# Patient Record
Sex: FEMALE | Employment: FULL TIME | URBAN - METROPOLITAN AREA
[De-identification: names, ages, dates, MRNs, and addresses within clinical notes are randomized per-mention and may not be internally consistent; named-entity substitution may affect disease eponyms.]

---

## 2018-08-14 ENCOUNTER — OFFICE VISIT (OUTPATIENT)
Dept: URGENT CARE | Facility: URGENT CARE | Age: 43
End: 2018-08-14

## 2018-08-14 VITALS
SYSTOLIC BLOOD PRESSURE: 104 MMHG | TEMPERATURE: 98.4 F | WEIGHT: 140.8 LBS | HEART RATE: 62 BPM | DIASTOLIC BLOOD PRESSURE: 66 MMHG

## 2018-08-14 DIAGNOSIS — L50.9 URTICARIA: ICD-10-CM
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
## 2018-08-14 DIAGNOSIS — S40.861A INSECT BITE OF RIGHT UPPER EXTREMITY, INITIAL ENCOUNTER: Primary | ICD-10-CM

## 2018-08-14 PROCEDURE — 99203 OFFICE O/P NEW LOW 30 MIN: CPT | Performed by: NURSE PRACTITIONER

## 2018-08-14 RX ORDER — CETIRIZINE HYDROCHLORIDE 10 MG/1
10 TABLET ORAL EVERY EVENING
Qty: 14 TABLET | Refills: 0 | Status: SHIPPED | OUTPATIENT
Start: 2018-08-14

## 2018-08-14 RX ORDER — TRIAMCINOLONE ACETONIDE 1 MG/G
OINTMENT TOPICAL
Qty: 30 G | Refills: 0 | Status: SHIPPED | OUTPATIENT
Start: 2018-08-14 | End: 2018-08-14

## 2018-08-14 RX ORDER — TRIAMCINOLONE ACETONIDE 1 MG/G
OINTMENT TOPICAL
Qty: 30 G | Refills: 0 | Status: SHIPPED | OUTPATIENT
Start: 2018-08-14

## 2018-08-14 RX ORDER — CETIRIZINE HYDROCHLORIDE 10 MG/1
10 TABLET ORAL EVERY EVENING
Qty: 14 TABLET | Refills: 0 | Status: SHIPPED | OUTPATIENT
Start: 2018-08-14 | End: 2018-08-14

## 2018-08-14 ASSESSMENT — ENCOUNTER SYMPTOMS
RHINORRHEA: 0
SHORTNESS OF BREATH: 0
DIARRHEA: 0
NAUSEA: 0
VOMITING: 0
FEVER: 0
CHILLS: 0
HEADACHES: 0
SORE THROAT: 0
COUGH: 0

## 2018-08-14 ASSESSMENT — PAIN SCALES - GENERAL: PAINLEVEL: NO PAIN (0)

## 2018-08-14 NOTE — PATIENT INSTRUCTIONS
"  Insect, Spider, and Scorpion Bites and Stings  Most insect bites are harmless and cause only minor swelling or itching. But if you re allergic to insects such as wasps or bees, a sting can cause a life-threatening allergic reaction. Some ticks can carry and transmit serious diseases. The venom (poison) from scorpions and certain spiders can also be deadly, although this is rare. Knowing when to seek emergency care could save your life.     The black  (top) and brown recluse (bottom) are two poisonous spiders found in the [place].   When to go to the emergency room (ER)    Scorpion sting    Bite from a black, red, or brown  spider or brown recluse spider    Severe pain or swelling at the site of bite    A tick that is embedded in your skin and can not be easily removed at home    Signs of an allergic reaction such as:  ? Hives  ? Swelling of your eyes, lips, or the inside of your throat  ? Trouble breathing  ? Dizziness or confusion  What to expect in the ER    If you re having trouble breathing, you ll be given oxygen through a mask. In case of severe breathing difficulty, you may have a tube inserted in your throat and be placed on a ventilator (breathing machine).    If you are having a severe allergic reaction from a sting (called anaphylaxis), you may be given a shot of epinephrine. If it is known that you are allergic to bee or wasp stings, your doctor may give you a prescription for an \"epi-pen\" that you can keep with you at all times in case of a sting.    You may receive antivenin (a substance that reverses the effects of poison) for some spider bites and scorpion stings. Because antivenin can sometimes cause other problems, your doctor will weigh the risks and benefits of this treatment.    Steroids such as prednisone are often used to treat allergic reactions. In many cases, your doctor will also prescribe an antihistamine to help relieve itching.  Easing symptoms of an insect bite or " sting    Try to remove a stinger you can see. Use your fingernail, a knife edge, or credit card to scrape against the skin. Do not squeeze or pull.    Apply ice or a cold compress to reduce pain and swelling (keep a thin cloth between the cold source and the skin).   Date Last Reviewed: 12/1/2016 2000-2017 [employer]. [address] [place] [address], [place] [address]. All rights reserved. This information is not intended as a substitute for professional medical care. Always follow your healthcare professional's instructions.

## 2018-08-14 NOTE — PROGRESS NOTES
SUBJECTIVE:   Jaymie Harvey is a 42 year old female presenting with a chief complaint of   Chief Complaint   Patient presents with     Work Comp     Insect Bites     spider bite 8/14/2018: right arm. pt has itching, redness, and warm to touch. Pt has applied ointment but unsure of the name       She is a new patient of [facility].    Rash    Onset of rash was 2 hour(s) ago.   Course of illness is worsening.  Severity moderate  Current and Associated symptoms: itching, burning, swelling and red   Location of the rash: arm, lower.  Previous history of a similar rash? No  Recent exposure history: insect bite  Denies exposure to: none known  Associated symptoms include: nothing.  Treatment measures tried include: none      Review of Systems   Constitutional: Negative for chills and fever.   HENT: Negative for congestion, ear pain, rhinorrhea and sore throat.    Respiratory: Negative for cough and shortness of breath.    Gastrointestinal: Negative for diarrhea, nausea and vomiting.   Skin: Positive for rash.   Neurological: Negative for headaches.   All other systems reviewed and are negative.      No past medical history on file.  No family history on file.  Current Outpatient Prescriptions   Medication Sig Dispense Refill     cetirizine (ZYRTEC) 10 MG tablet Take 1 tablet (10 mg) by mouth every evening for 14 days 14 tablet 0     triamcinolone (KENALOG) 0.1 % ointment Apply sparingly to affected area three times daily for 14 days. 30 g 0     Social History   Substance Use Topics     Smoking status: Not on file     Smokeless tobacco: Not on file     Alcohol use Not on file       OBJECTIVE  /66 (BP Location: Left arm, Patient Position: Sitting, Cuff Size: Adult Regular)  Pulse 62  Temp 98.4  F (36.9  C) (Oral)  Wt 140 lb 12.8 oz (63.9 kg)    Physical Exam   Cardiovascular: Normal rate.    Pulmonary/Chest: Effort normal.   Neurological: She is alert.   Skin: Rash noted.        Psychiatric: She has a normal mood and  affect. Her behavior is normal. Thought content normal.   Vitals reviewed.      ASSESSMENT:      ICD-10-CM    1. Insect bite of right upper extremity, initial encounter S40.861A triamcinolone (KENALOG) 0.1 % ointment    W57.XXXA    2. Urticaria L50.9 cetirizine (ZYRTEC) 10 MG tablet          Serious Comorbid Conditions:  Adult:  None    PLAN:  I have discussed clinical findings with patient.  Advised no to scratch on the rash.  Symptomatic care is discussed.  I have discussed the possibility of  worsening symptoms and to RTC or ER if they occur.  All questions are answered and patient is in agreement with plan.   Patient care instructions are given to at the end of visit.

## 2018-08-14 NOTE — LETTER
[facility]  [address]  [facility] [address]  Phone: [phone]    August 14, 2018        Jaymie Harvey  [address]   [facility] [address]          To whom it may concern:    RE: Jaymie Harvey    Patient was seen and treated today at our clinic and missed work. Please allow her to rest home 8/15/2018. Patient may return to work 8/16/2018 with no restrictions.    Please contact me for questions or concerns.      Sincerely,        Meghan Hernandez NP

## 2018-08-14 NOTE — MR AVS SNAPSHOT
"              After Visit Summary   8/14/2018    Jaymie Harvey    MRN: [id number]           Patient Information     Date Of Birth          1975        Visit Information        Provider Department      8/14/2018 11:00 AM Meghan Hernandez NP [facility]        Today's Diagnoses     Insect bite of right upper extremity, initial encounter    -  1    Urticaria          Care Instructions      Insect, Spider, and Scorpion Bites and Stings  Most insect bites are harmless and cause only minor swelling or itching. But if you re allergic to insects such as wasps or bees, a sting can cause a life-threatening allergic reaction. Some ticks can carry and transmit serious diseases. The venom (poison) from scorpions and certain spiders can also be deadly, although this is rare. Knowing when to seek emergency care could save your life.     The black  (top) and brown recluse (bottom) are two poisonous spiders found in the [place].   When to go to the emergency room (ER)    Scorpion sting    Bite from a black, red, or brown  spider or brown recluse spider    Severe pain or swelling at the site of bite    A tick that is embedded in your skin and can not be easily removed at home    Signs of an allergic reaction such as:  ? Hives  ? Swelling of your eyes, lips, or the inside of your throat  ? Trouble breathing  ? Dizziness or confusion  What to expect in the ER    If you re having trouble breathing, you ll be given oxygen through a mask. In case of severe breathing difficulty, you may have a tube inserted in your throat and be placed on a ventilator (breathing machine).    If you are having a severe allergic reaction from a sting (called anaphylaxis), you may be given a shot of epinephrine. If it is known that you are allergic to bee or wasp stings, your doctor may give you a prescription for an \"epi-pen\" that you can keep with you at all times in case of a sting.    You may receive antivenin (a " substance that reverses the effects of poison) for some spider bites and scorpion stings. Because antivenin can sometimes cause other problems, your doctor will weigh the risks and benefits of this treatment.    Steroids such as prednisone are often used to treat allergic reactions. In many cases, your doctor will also prescribe an antihistamine to help relieve itching.  Easing symptoms of an insect bite or sting    Try to remove a stinger you can see. Use your fingernail, a knife edge, or credit card to scrape against the skin. Do not squeeze or pull.    Apply ice or a cold compress to reduce pain and swelling (keep a thin cloth between the cold source and the skin).   Date Last Reviewed: 12/1/2016 2000-2017 [employer]. [address] [place] [address], [place] [address]. All rights reserved. This information is not intended as a substitute for professional medical care. Always follow your healthcare professional's instructions.                Follow-ups after your visit        Who to contact     If you have questions or need follow up information about today's clinic visit or your schedule please contact [facility] directly at [phone].  Normal or non-critical lab and imaging results will be communicated to you by Photos to Photoshart, letter or phone within 4 business days after the clinic has received the results. If you do not hear from us within 7 days, please contact the clinic through Photos to Photoshart or phone. If you have a critical or abnormal lab result, we will notify you by phone as soon as possible.  Submit refill requests through Healthcare IT or call your pharmacy and they will forward the refill request to us. Please allow 3 business days for your refill to be completed.          Additional Information About Your Visit        Photos to PhotosharShareholder InSite Information     Healthcare IT lets you send messages to your doctor, view your test results, renew your prescriptions, schedule appointments and more. To sign up, go to  [email or web address] . Click on \"Log in\" on the left side of the screen, which will take you to the Welcome page. Then click on \"Sign up Now\" on the right side of the page.     You will be asked to enter the access code listed below, as well as some personal information. Please follow the directions to create your username and password.     Your access code is: [email or web address]  Expires: 2018 11:23 AM     Your access code will  in 90 days. If you need help or a new code, please call your [place] clinic or [phone].        Care EveryWhere ID     This is your Care EveryWhere ID. This could be used by other organizations to access your [place] medical records  [id number]        Your Vitals Were     Pulse Temperature                62 98.4  F (36.9  C) (Oral)           Blood Pressure from Last 3 Encounters:   18 104/66    Weight from Last 3 Encounters:   18 140 lb 12.8 oz (63.9 kg)              Today, you had the following     No orders found for display         Today's Medication Changes          These changes are accurate as of 18 11:23 AM.  If you have any questions, ask your nurse or doctor.               Start taking these medicines.        Dose/Directions    cetirizine 10 MG tablet   Commonly known as:  zyrTEC   Used for:  Urticaria        Dose:  10 mg   Take 1 tablet (10 mg) by mouth every evening for 14 days   Quantity:  14 tablet   Refills:  0       triamcinolone 0.1 % ointment   Commonly known as:  KENALOG   Used for:  Insect bite of right upper extremity, initial encounter        Apply sparingly to affected area three times daily for 14 days.   Quantity:  30 g   Refills:  0            Where to get your medicines      Some of these will need a paper prescription and others can be bought over the counter.  Ask your nurse if you have questions.     Bring a paper prescription for each of these medications     cetirizine 10 MG tablet    triamcinolone 0.1 % ointment             "    Primary Care Provider Fax #    Physician No Ref-Primary [phone]       No address on file        Equal Access to Services     ERICK GOODEN : Hadii aad ku hadtesfaye Dodson, wacoyda luhero, james kacristóbalda kaylyn, alon josselinetawanda frank lamairaeder alcantar. So [facility] [phone].    ATENCIÓN: Si habla español, tiene a whipple disposición servicios gratuitos de asistencia lingüística. Llame al [phone].    We comply with applicable federal civil rights laws and [place] laws. We do not discriminate on the basis of race, color, national origin, age, disability, sex, sexual orientation, or gender identity.            Thank you!     Thank you for choosing [facility]  for your care. Our goal is always to provide you with excellent care. Hearing back from our patients is one way we can continue to improve our services. Please take a few minutes to complete the written survey that you may receive in the mail after your visit with us. Thank you!             Your Updated Medication List - Protect others around you: Learn how to safely use, store and throw away your medicines at www.disposemymeds.org.          This list is accurate as of 8/14/18 11:23 AM.  Always use your most recent med list.                   Brand Name Dispense Instructions for use Diagnosis    cetirizine 10 MG tablet    zyrTEC    14 tablet    Take 1 tablet (10 mg) by mouth every evening for 14 days    Urticaria       triamcinolone 0.1 % ointment    KENALOG    30 g    Apply sparingly to affected area three times daily for 14 days.    Insect bite of right upper extremity, initial encounter

## 2018-09-12 ENCOUNTER — HOSPITAL ENCOUNTER (OUTPATIENT)
Dept: GENERAL RADIOLOGY | Facility: CLINIC | Age: 43
Discharge: HOME | End: 2018-09-12
Attending: INTERNAL MEDICINE | Admitting: INTERNAL MEDICINE

## 2018-09-12 DIAGNOSIS — R76.11 PPD POSITIVE: ICD-10-CM

## 2018-09-12 PROCEDURE — 40000293 XR CHEST 1 VIEW, EMPLOYEE HEALTH

## 2018-12-01 ENCOUNTER — HOSPITAL ENCOUNTER (EMERGENCY)
Facility: CLINIC | Age: 43
Discharge: HOME | End: 2018-12-01
Attending: EMERGENCY MEDICINE | Admitting: EMERGENCY MEDICINE
Payer: OTHER MISCELLANEOUS

## 2018-12-01 VITALS
HEART RATE: 65 BPM | OXYGEN SATURATION: 99 % | SYSTOLIC BLOOD PRESSURE: 101 MMHG | RESPIRATION RATE: 18 BRPM | DIASTOLIC BLOOD PRESSURE: 46 MMHG | TEMPERATURE: 99 F

## 2018-12-01 DIAGNOSIS — Z77.098 CHEMICAL EXPOSURE: ICD-10-CM

## 2018-12-01 PROCEDURE — 99283 EMERGENCY DEPT VISIT LOW MDM: CPT | Mod: Z6 | Performed by: EMERGENCY MEDICINE

## 2018-12-01 PROCEDURE — 99282 EMERGENCY DEPT VISIT SF MDM: CPT | Performed by: EMERGENCY MEDICINE

## 2018-12-01 ASSESSMENT — ENCOUNTER SYMPTOMS
NAUSEA: 0
WOUND: 1
DIARRHEA: 0
SORE THROAT: 0
WEAKNESS: 0
BACK PAIN: 0
COUGH: 0
CONFUSION: 0
RHINORRHEA: 0
SHORTNESS OF BREATH: 0
EYE DISCHARGE: 0
HEADACHES: 0
ABDOMINAL PAIN: 0
FEVER: 0
BRUISES/BLEEDS EASILY: 0
CHILLS: 0
MYALGIAS: 0
FLANK PAIN: 0
VOMITING: 0
DYSURIA: 0
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
## 2018-12-01 NOTE — ED AVS SNAPSHOT
[facility], [place], Emergency Department    [address] [place] [address]    Phone:  [phone]                                       Jaymie Harvey   MRN: [id number]    Department:  [facility], Emergency Department   Date of Visit:  12/1/2018           After Visit Summary Signature Page     I have received my discharge instructions, and my questions have been answered. I have discussed any challenges I see with this plan with the nurse or doctor.    ..........................................................................................................................................  Patient/Patient Representative Signature      ..........................................................................................................................................  Patient Representative Print Name and Relationship to Patient    ..................................................               ................................................  Date                                   Time    ..........................................................................................................................................  Reviewed by Signature/Title    ...................................................              ..............................................  Date                                               Time          22EPIC Rev 08/18
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
## 2018-12-01 NOTE — ED NOTES
"Patient told that treatment for exposure is to \"wash off immediately with plenty of water.\" (per MSDS).  "

## 2018-12-01 NOTE — LETTER
December 1, 2018      To Whom It May Concern:      Jaymie Harvey was seen in our Emergency Department today, 12/01/18.   She may return to work when improved.    Sincerely,        Leanne Lauren MD
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
## 2019-12-05 ENCOUNTER — OFFICE VISIT (OUTPATIENT)
Dept: OPTOMETRY | Facility: CLINIC | Age: 44
End: 2019-12-05
Payer: COMMERCIAL

## 2019-12-05 DIAGNOSIS — H52.4 PRESBYOPIA: ICD-10-CM

## 2019-12-05 DIAGNOSIS — H52.223 REGULAR ASTIGMATISM OF BOTH EYES: ICD-10-CM

## 2019-12-05 DIAGNOSIS — Z01.00 ENCOUNTER FOR EXAMINATION OF EYES AND VISION WITHOUT ABNORMAL FINDINGS: Primary | ICD-10-CM

## 2019-12-05 PROCEDURE — 92004 COMPRE OPH EXAM NEW PT 1/>: CPT | Performed by: OPTOMETRIST

## 2019-12-05 PROCEDURE — 92015 DETERMINE REFRACTIVE STATE: CPT | Performed by: OPTOMETRIST

## 2019-12-05 ASSESSMENT — VISUAL ACUITY
CORRECTION_TYPE: GLASSES
OD_SC: 20/60
OD_SC+: -1
OS_SC: 20/120
METHOD: SNELLEN - LINEAR
OS_CC: 20/20 -1
OD_SC: 20/30
OS_SC: 20/25
OD_CC: 20/20 -1
OS_CC: 20/30
OD_CC: 20/30

## 2019-12-05 ASSESSMENT — REFRACTION_MANIFEST
OD_SPHERE: -0.25
OS_ADD: +2.00
OS_SPHERE: +0.50
OS_AXIS: 003
OD_AXIS: 030
OD_CYLINDER: +0.50
OD_ADD: +2.00
OS_CYLINDER: +0.75

## 2019-12-05 ASSESSMENT — TONOMETRY
OD_IOP_MMHG: 11
IOP_METHOD: APPLANATION
OS_IOP_MMHG: 10

## 2019-12-05 ASSESSMENT — CONF VISUAL FIELD
OS_NORMAL: 1
OD_NORMAL: 1

## 2019-12-05 ASSESSMENT — REFRACTION_WEARINGRX
OD_ADD: +2.00
OD_CYLINDER: +0.50
OS_AXIS: 172
OS_SPHERE: +0.75
OD_AXIS: 180
OS_ADD: +2.00
SPECS_TYPE: BIFOCAL
OS_CYLINDER: +0.75
OD_SPHERE: +0.50

## 2019-12-05 ASSESSMENT — SLIT LAMP EXAM - LIDS
COMMENTS: NORMAL
COMMENTS: NORMAL

## 2019-12-05 ASSESSMENT — CUP TO DISC RATIO
OD_RATIO: 0.3
OS_RATIO: 0.35

## 2019-12-05 ASSESSMENT — EXTERNAL EXAM - RIGHT EYE: OD_EXAM: NORMAL

## 2019-12-05 ASSESSMENT — EXTERNAL EXAM - LEFT EYE: OS_EXAM: NORMAL

## 2019-12-05 NOTE — PATIENT INSTRUCTIONS
Jaymie was advised of today's exam findings.  Fill glasses prescription  Allow 2 weeks to adapt to change in glasses  Wear glasses full time  Copy of glasses Rx provided today.    Return in 2 years for eye exam,  unless health reason arises , or sooner if needed.    The effects of the dilating drops last for 4- 6 hours.  You will be more sensitive to light and vision will be blurry up close.  Mydriatic sunglasses were given if needed.      Kevin Esquivel O.D.  [address]  [place]  [address]    [phone]

## 2019-12-05 NOTE — PROGRESS NOTES
Chief Complaint   Patient presents with     Annual Eye Exam      Accompanied by self  Last Eye Exam: 2 years ago  Dilated Previously: Yes    What are you currently using to see?  Glasses-2 years old       Distance Vision Acuity: Noticed gradual change in both eyes    Near Vision Acuity: Not satisfied     Eye Comfort: good  Do you use eye drops? : No  Occupation or Hobbies: surgical tech    Elicia Cadet, Optometric Tech          Medical, surgical and family histories reviewed and updated 12/5/2019.       OBJECTIVE: See Ophthalmology exam    ASSESSMENT:    ICD-10-CM    1. Encounter for examination of eyes and vision without abnormal findings Z01.00 EYE EXAM (SIMPLE-NONBILLABLE)   2. Regular astigmatism of both eyes H52.223 EYE EXAM (SIMPLE-NONBILLABLE)     REFRACTION   3. Presbyopia H52.4 EYE EXAM (SIMPLE-NONBILLABLE)     REFRACTION      PLAN:     Patient Instructions   Gmasnoh was advised of today's exam findings.  Fill glasses prescription  Allow 2 weeks to adapt to change in glasses  Wear glasses full time  Copy of glasses Rx provided today.    Return in 2 years for eye exam,  unless health reason arises , or sooner if needed.    The effects of the dilating drops last for 4- 6 hours.  You will be more sensitive to light and vision will be blurry up close.  Mydriatic sunglasses were given if needed.      Kevin Esquivel O.D.  [facility]  [address] [place] [address]. [place]  [address]    [phone]

## 2019-12-05 NOTE — LETTER
12/5/2019         RE: Jaymie Harvey  [address]  [place] [address]        Dear Colleague,    Thank you for referring your patient, Jaymie Harvey, to the [facility]. Please see a copy of my visit note below.    Chief Complaint   Patient presents with     Annual Eye Exam      Accompanied by self  Last Eye Exam: 2 years ago  Dilated Previously: Yes    What are you currently using to see?  Glasses-2 years old       Distance Vision Acuity: Noticed gradual change in both eyes    Near Vision Acuity: Not satisfied     Eye Comfort: good  Do you use eye drops? : No  Occupation or Hobbies: surgical tech    Elicia Cadet, Optometric Tech          Medical, surgical and family histories reviewed and updated 12/5/2019.       OBJECTIVE: See Ophthalmology exam    ASSESSMENT:    ICD-10-CM    1. Encounter for examination of eyes and vision without abnormal findings Z01.00 EYE EXAM (SIMPLE-NONBILLABLE)   2. Regular astigmatism of both eyes H52.223 EYE EXAM (SIMPLE-NONBILLABLE)     REFRACTION   3. Presbyopia H52.4 EYE EXAM (SIMPLE-NONBILLABLE)     REFRACTION      PLAN:     Patient Instructions   Jaymie was advised of today's exam findings.  Fill glasses prescription  Allow 2 weeks to adapt to change in glasses  Wear glasses full time  Copy of glasses Rx provided today.    Return in 2 years for eye exam,  unless health reason arises , or sooner if needed.    The effects of the dilating drops last for 4- 6 hours.  You will be more sensitive to light and vision will be blurry up close.  Mydriatic sunglasses were given if needed.      Kevin Esquivel O.D.  [facility]  [address] [place] [address] Ave. [place]  [address]    [phone]           Again, thank you for allowing me to participate in the care of your patient.        Sincerely,        Kevin Esquivel, OD

## 2020-05-04 ENCOUNTER — RESULTS ONLY (OUTPATIENT)
Dept: LAB | Age: 45
End: 2020-05-04

## 2020-05-04 ENCOUNTER — APPOINTMENT (OUTPATIENT)
Dept: URGENT CARE | Facility: URGENT CARE | Age: 45
End: 2020-05-04
Payer: COMMERCIAL

## 2020-05-05 LAB
SARS-COV-2 RNA SPEC QL NAA+PROBE: ABNORMAL
SPECIMEN SOURCE: ABNORMAL

## 2020-05-12 ENCOUNTER — APPOINTMENT (OUTPATIENT)
Dept: URGENT CARE | Facility: URGENT CARE | Age: 45
End: 2020-05-12
Payer: COMMERCIAL

## 2020-05-12 ENCOUNTER — RESULTS ONLY (OUTPATIENT)
Dept: LAB | Age: 45
End: 2020-05-12

## 2020-05-14 LAB
SARS-COV-2 RNA SPEC QL NAA+PROBE: NOT DETECTED
SPECIMEN SOURCE: NORMAL

## 2020-05-20 ENCOUNTER — RESULTS ONLY (OUTPATIENT)
Dept: LAB | Age: 45
End: 2020-05-20

## 2020-05-20 ENCOUNTER — APPOINTMENT (OUTPATIENT)
Dept: URGENT CARE | Facility: URGENT CARE | Age: 45
End: 2020-05-20
Payer: COMMERCIAL

## 2020-05-21 LAB
SARS-COV-2 RNA SPEC QL NAA+PROBE: ABNORMAL
SPECIMEN SOURCE: ABNORMAL

## 2021-08-27 ENCOUNTER — OFFICE VISIT (OUTPATIENT)
Dept: OPTOMETRY | Facility: CLINIC | Age: 46
End: 2021-08-27
Payer: COMMERCIAL

## 2021-08-27 DIAGNOSIS — H52.223 REGULAR ASTIGMATISM OF BOTH EYES: ICD-10-CM

## 2021-08-27 DIAGNOSIS — H52.4 PRESBYOPIA: ICD-10-CM

## 2021-08-27 DIAGNOSIS — H52.03 HYPERMETROPIA, BILATERAL: ICD-10-CM

## 2021-08-27 DIAGNOSIS — Z01.00 ENCOUNTER FOR EXAMINATION OF EYES AND VISION WITHOUT ABNORMAL FINDINGS: Primary | ICD-10-CM

## 2021-08-27 PROBLEM — N97.9 INFERTILITY, FEMALE: Status: ACTIVE | Noted: 2019-08-23

## 2021-08-27 PROBLEM — N89.8 VAGINAL DISCHARGE: Status: ACTIVE | Noted: 2017-05-24

## 2021-08-27 PROBLEM — H10.13 ALLERGIC CONJUNCTIVITIS OF BOTH EYES: Status: ACTIVE | Noted: 2017-04-14

## 2021-08-27 PROBLEM — R10.33 PERIUMBILICAL ABDOMINAL PAIN: Status: ACTIVE | Noted: 2017-05-03

## 2021-08-27 PROBLEM — M79.10 MYALGIA: Status: ACTIVE | Noted: 2019-10-29

## 2021-08-27 PROBLEM — H52.203 HYPEROPIA OF BOTH EYES WITH ASTIGMATISM AND PRESBYOPIA: Status: ACTIVE | Noted: 2017-06-02

## 2021-08-27 PROBLEM — R30.0 DYSURIA: Status: ACTIVE | Noted: 2019-02-12

## 2021-08-27 PROCEDURE — 92014 COMPRE OPH EXAM EST PT 1/>: CPT | Performed by: OPTOMETRIST

## 2021-08-27 PROCEDURE — 92015 DETERMINE REFRACTIVE STATE: CPT | Performed by: OPTOMETRIST

## 2021-08-27 RX ORDER — PRENATAL VIT/IRON FUM/FOLIC AC 27MG-0.8MG
TABLET ORAL
COMMUNITY
Start: 2021-08-25

## 2021-08-27 RX ORDER — ATAZANAVIR AND COBICISTAT 300; 150 MG/1; MG/1
TABLET ORAL
COMMUNITY
Start: 2021-04-22

## 2021-08-27 RX ORDER — DOLUTEGRAVIR SODIUM 50 MG/1
TABLET, FILM COATED ORAL
COMMUNITY
Start: 2021-08-25

## 2021-08-27 RX ORDER — EMTRICITABINE AND TENOFOVIR DISOPROXIL FUMARATE 200; 300 MG/1; MG/1
TABLET, FILM COATED ORAL
COMMUNITY
Start: 2021-08-25

## 2021-08-27 RX ORDER — CHOLECALCIFEROL (VITAMIN D3) 25 MCG
TABLET ORAL
COMMUNITY
Start: 2021-02-25

## 2021-08-27 RX ORDER — ACETAMINOPHEN 325 MG/1
650 TABLET ORAL
COMMUNITY
Start: 2019-01-21

## 2021-08-27 ASSESSMENT — REFRACTION_WEARINGRX
OD_AXIS: 031
OS_CYLINDER: +0.75
OD_SPHERE: PLANO
SPECS_TYPE: PAL
OD_CYLINDER: +0.75
OD_ADD: +1.75
OS_SPHERE: +0.50
OS_AXIS: 179
OS_ADD: +1.75

## 2021-08-27 ASSESSMENT — EXTERNAL EXAM - LEFT EYE: OS_EXAM: NORMAL

## 2021-08-27 ASSESSMENT — VISUAL ACUITY
OD_CC: 20/50
OS_CC: 20/60
OS_CC: 20/50
OD_SC: 20/60
CORRECTION_TYPE: GLASSES
METHOD: SNELLEN - LINEAR
OD_SC: 20/40
OS_SC: 20/120
OS_SC: 20/40
OD_CC: 20/60

## 2021-08-27 ASSESSMENT — REFRACTION_MANIFEST
OD_ADD: +2.00
OS_SPHERE: +0.50
OS_CYLINDER: +0.50
OS_ADD: +2.00
OD_SPHERE: +0.50
OS_AXIS: 003
OD_AXIS: 015
OD_CYLINDER: +0.50

## 2021-08-27 ASSESSMENT — TONOMETRY
OS_IOP_MMHG: 10
IOP_METHOD: APPLANATION
OD_IOP_MMHG: 10

## 2021-08-27 ASSESSMENT — CONF VISUAL FIELD
METHOD: COUNTING FINGERS
OD_NORMAL: 1
OS_NORMAL: 1

## 2021-08-27 ASSESSMENT — CUP TO DISC RATIO
OS_RATIO: 0.35
OD_RATIO: 0.3

## 2021-08-27 ASSESSMENT — SLIT LAMP EXAM - LIDS
COMMENTS: NORMAL, ARTIFICIAL LASHES UL
COMMENTS: NORMAL, ARTIFICIAL LASHES UL

## 2021-08-27 ASSESSMENT — EXTERNAL EXAM - RIGHT EYE: OD_EXAM: NORMAL

## 2021-08-27 NOTE — LETTER
8/27/2021         RE: Jaymie Harvey  [address] [place]  [place] [facility] [place] [address]        Dear Colleague,    Thank you for referring your patient, Jaymie Harvey, to the [facility]. Please see a copy of my visit note below.    Chief Complaint   Patient presents with     Annual Eye Exam         Last Eye Exam: 12/5/2019  Dilated Previously: Yes, side effects of dilation explained today    What are you currently using to see?  glasses    Distance Vision Acuity: Noticed gradual change in both eyes    Near Vision Acuity: Not satisfied    Eye Comfort: itchy (rarely)  Do you use eye drops? : No  Occupation or Hobbies: [employer]        Medical, surgical and family histories reviewed and updated 8/27/2021.       OBJECTIVE: See Ophthalmology exam    ASSESSMENT:    ICD-10-CM    1. Encounter for examination of eyes and vision without abnormal findings  Z01.00    2. Hypermetropia, bilateral  H52.03    3. Regular astigmatism of both eyes  H52.223    4. Presbyopia  H52.4       PLAN:     Patient Instructions   Jaymie was advised of today's exam findings.  Optional to fill new glasses prescription, minimal change  Copy of glasses Rx provided today.    Return in 2 years for eye exam,  or sooner if needed.    The effects of the dilating drops last for 4- 6 hours.  You will be more sensitive to light and vision will be blurry up close.  Mydriatic sunglasses were given if needed.    Kevin Esquivel O.D.  [facility]  [address] [place] [address] [place]. [place]  [address]    [phone]             Again, thank you for allowing me to participate in the care of your patient.        Sincerely,        Kevin Esquivel OD

## 2021-08-27 NOTE — PATIENT INSTRUCTIONS
Jamyie was advised of today's exam findings.  Optional to fill new glasses prescription, minimal change  Copy of glasses Rx provided today.    Return in 2 years for eye exam,  or sooner if needed.    The effects of the dilating drops last for 4- 6 hours.  You will be more sensitive to light and vision will be blurry up close.  Mydriatic sunglasses were given if needed.    Kevin Esquivel O.D.  [address]  [place]  [address]    [phone]

## 2021-08-27 NOTE — PROGRESS NOTES
Chief Complaint   Patient presents with     Annual Eye Exam         Last Eye Exam: 12/5/2019  Dilated Previously: Yes, side effects of dilation explained today    What are you currently using to see?  glasses    Distance Vision Acuity: Noticed gradual change in both eyes    Near Vision Acuity: Not satisfied    Eye Comfort: itchy (rarely)  Do you use eye drops? : No  Occupation or Hobbies: [employer]        Medical, surgical and family histories reviewed and updated 8/27/2021.       OBJECTIVE: See Ophthalmology exam    ASSESSMENT:    ICD-10-CM    1. Encounter for examination of eyes and vision without abnormal findings  Z01.00    2. Hypermetropia, bilateral  H52.03    3. Regular astigmatism of both eyes  H52.223    4. Presbyopia  H52.4       PLAN:     Patient Instructions   Gmasnoh was advised of today's exam findings.  Optional to fill new glasses prescription, minimal change  Copy of glasses Rx provided today.    Return in 2 years for eye exam,  or sooner if needed.    The effects of the dilating drops last for 4- 6 hours.  You will be more sensitive to light and vision will be blurry up close.  Mydriatic sunglasses were given if needed.    Kevin Esquivel O.D.  [facility]  [address] [place] [address]. [place]  [address] [phone]

## 2023-09-27 ENCOUNTER — NURSE TRIAGE (OUTPATIENT)
Dept: OPTOMETRY | Facility: CLINIC | Age: 48
End: 2023-09-27
Payer: COMMERCIAL

## 2023-09-27 NOTE — TELEPHONE ENCOUNTER
"47 year old calls with blurred vision  She states it started about 3 weeks ago  She started to experience headaches and eye ache also  She states if she puts on her reading glasses the headache/eye ache improves The blurriness continues with the reading glasses  both eyes are equally blurry If she lays down and closes her eyes the pain improves also   Denies any neurological deficit     Will forward to eye team to follow-up             Reason for Disposition   Patient wants to be seen    Additional Information   Negative: Weakness of the face, arm or leg on one side of the body   Negative: Followed getting substance in the eye   Negative: Foreign body stuck in the eye   Negative: Followed an eye injury   Negative: Followed sun lamp or sun exposure (UV keratitis)   Negative: Yellow or green discharge (pus) in the eye   Negative: Pregnant   Negative: Complete loss of vision in one or both eyes   Negative: SEVERE eye pain   Negative: SEVERE headache   Negative: Double vision   Negative: Blurred vision or visual changes and present now and sudden onset or new (e.g., minutes, hours, days)  (Exception: Seeing floaters / black specks OR previously diagnosed migraine headaches with same symptoms.)   Negative: Patient sounds very sick or weak to the triager   Negative: Flashes of light  (Exception: Brief from pressing on the eyeball.)   Negative: Many floaters in the eye (Exception: Floater(s) are a chronic symptom and this is unchanged from patient's baseline pattern.)   Negative: Eye pain and brief (now gone) blurred vision or visual changes   Negative: Taking digoxin (e.g., Lanoxin, Digitek, Cardoxin, Lanoxicaps; Toloxin in [place]) and blurred vision, yellow vision, or yellow-green halos    Answer Assessment - Initial Assessment Questions  1. DESCRIPTION: \"How has your vision changed?\" (e.g., complete vision loss, blurred vision, double vision, floaters, etc.)      Blurred vision  2. LOCATION: \"One or both eyes?\" If one, " "ask: \"Which eye?\"      Both eyes  3. SEVERITY: \"Can you see anything?\" If Yes, ask: \"What can you see?\" (e.g., fine print)      Yes but blurry  4. ONSET: \"When did this begin?\" \"Did it start suddenly or has this been gradual?\"      3 weeks ago  5. PATTERN: \"Does this come and go, or has it been constant since it started?\"      Blurred vision constant  6. PAIN: \"Is there any pain in your eye(s)?\"  (Scale 1-10; or mild, moderate, severe)    - NONE (0): No pain.    - MILD (1-3): Doesn't interfere with normal activities.    - MODERATE (4-7): Interferes with normal activities or awakens from sleep.     - SEVERE (8-10): Excruciating pain, unable to do any normal activities.      Pain eyes and forehead   mild to moderate  7. CONTACTS-GLASSES: \"Do you wear contacts or glasses?\"      Reading glasses  8. CAUSE: \"What do you think is causing this visual problem?\"      Not sure  9. OTHER SYMPTOMS: \"Do you have any other symptoms?\" (e.g., confusion, headache, arm or leg weakness, speech problems)      Headache  but improves when wearing reading glasses  10. PREGNANCY: \"Is there any chance you are pregnant?\" \"When was your last menstrual period?\"        no    Protocols used: Vision Loss or Change-A-OH    "

## 2023-09-27 NOTE — TELEPHONE ENCOUNTER
Caller reporting the following red-flag symptom(s): vision loss, approx 3 wks and now is getting worse, blurry. Irritated, discomfort, bad head aches, both eyes     Per the system red-flag symptom policy, patient was instructed to:  speak with a Registered Nurse    Action:  Patient warm transferred to a Registered Nurse Katerine

## 2023-09-27 NOTE — TELEPHONE ENCOUNTER
Called twice at 1729    No answer and unable to leave [facility]--mailbox full    Home/mobile--[phone] called

## 2023-09-28 NOTE — TELEPHONE ENCOUNTER
Spoke to pt around noon    Pt states wears progressive bifocals for 3 years and doing just fine    Past 3 weeks blurrier vision both far and near and having more headaches    Blurrier vision with glasses constant and seems to be worseing.    No redness/other eye symptoms noted    Scheduled in open new time slot with Dr. Feliciano at 0800    Pt aware of date/time/location/duration/hospital based clinic/main clinic number/parking-- PWB location    Yosef Coronel RN 12:27 PM 09/28/23

## 2023-09-28 NOTE — TELEPHONE ENCOUNTER
Patient calling back.   Patient was transferred from the [facility] to this [facility] RN. Incorrect transfer.     Patient would like to be called back soon. She has her phone near her and waiting for eye dept to call her back.     Sol VELASQUEZN, RN

## 2023-09-29 ENCOUNTER — OFFICE VISIT (OUTPATIENT)
Dept: OPHTHALMOLOGY | Facility: CLINIC | Age: 48
End: 2023-09-29
Attending: OPTOMETRIST
Payer: COMMERCIAL

## 2023-09-29 DIAGNOSIS — G44.89 OTHER HEADACHE SYNDROME: ICD-10-CM

## 2023-09-29 DIAGNOSIS — H52.4 PRESBYOPIA OF BOTH EYES: Primary | ICD-10-CM

## 2023-09-29 DIAGNOSIS — H25.13 NUCLEAR SENILE CATARACT OF BOTH EYES: ICD-10-CM

## 2023-09-29 DIAGNOSIS — Z01.00 EXAMINATION OF EYES AND VISION: ICD-10-CM

## 2023-09-29 DIAGNOSIS — H04.123 DRY EYE SYNDROME OF BOTH EYES: ICD-10-CM

## 2023-09-29 PROCEDURE — G0463 HOSPITAL OUTPT CLINIC VISIT: HCPCS | Performed by: OPTOMETRIST

## 2023-09-29 PROCEDURE — 99207 OCT OPTIC NERVE RNFL SPECTRALIS OU (BOTH EYES): CPT | Mod: 26 | Performed by: OPTOMETRIST

## 2023-09-29 PROCEDURE — 92004 COMPRE OPH EXAM NEW PT 1/>: CPT | Performed by: OPTOMETRIST

## 2023-09-29 PROCEDURE — 92134 CPTRZ OPH DX IMG PST SGM RTA: CPT | Performed by: OPTOMETRIST

## 2023-09-29 PROCEDURE — 92133 CPTRZD OPH DX IMG PST SGM ON: CPT | Performed by: OPTOMETRIST

## 2023-09-29 RX ORDER — CARBOXYMETHYLCELLULOSE SODIUM 5 MG/ML
1 SOLUTION/ DROPS OPHTHALMIC 4 TIMES DAILY
Qty: 400 EACH | Refills: 11 | Status: CANCELLED | OUTPATIENT
Start: 2023-09-29

## 2023-09-29 ASSESSMENT — REFRACTION_WEARINGRX
OS_AXIS: 179
SPECS_TYPE: PAL
OD_AXIS: 024
OD_CYLINDER: +0.50
OD_ADD: +2.00
OS_CYLINDER: +0.75
OS_ADD: +2.00
OS_SPHERE: +0.50
OD_SPHERE: PLANO

## 2023-09-29 ASSESSMENT — CONF VISUAL FIELD
OS_SUPERIOR_NASAL_RESTRICTION: 0
OS_SUPERIOR_TEMPORAL_RESTRICTION: 0
OS_INFERIOR_TEMPORAL_RESTRICTION: 0
OD_INFERIOR_TEMPORAL_RESTRICTION: 0
OD_SUPERIOR_TEMPORAL_RESTRICTION: 0
OD_SUPERIOR_NASAL_RESTRICTION: 0
OD_NORMAL: 1
OS_INFERIOR_NASAL_RESTRICTION: 0
OS_NORMAL: 1
OD_INFERIOR_NASAL_RESTRICTION: 0
METHOD: COUNTING FINGERS

## 2023-09-29 ASSESSMENT — CUP TO DISC RATIO
OD_RATIO: 0.4
OS_RATIO: 0.4

## 2023-09-29 ASSESSMENT — EXTERNAL EXAM - RIGHT EYE: OD_EXAM: NORMAL

## 2023-09-29 ASSESSMENT — VISUAL ACUITY
METHOD: SNELLEN - LINEAR
OS_SC+: -1
OD_SC: 20/30
OS_SC: 20/30
OD_SC+: -1
OD_PH_SC: 20/25

## 2023-09-29 ASSESSMENT — REFRACTION_MANIFEST
OS_SPHERE: +0.50
OD_AXIS: 020
OD_CYLINDER: +0.75
OS_AXIS: 170
OD_SPHERE: PLANO
OS_CYLINDER: +1.00

## 2023-09-29 ASSESSMENT — EXTERNAL EXAM - LEFT EYE: OS_EXAM: NORMAL

## 2023-09-29 ASSESSMENT — TONOMETRY
OS_IOP_MMHG: 10
IOP_METHOD: ICARE
OD_IOP_MMHG: 10

## 2023-09-29 ASSESSMENT — SLIT LAMP EXAM - LIDS
COMMENTS: NORMAL
COMMENTS: NORMAL

## 2023-09-29 NOTE — PROGRESS NOTES
"HPI:  Patient complains that she has to use glasses all the time to read. Headache does improve with reading glasses.     Name: G is silent. Pronounced \"mas-no.\"      Pertinent Medical History:  Tuberculosis lung  Thyroid enlargement  Beta thalassemia trait  AIDS  CMV IgG positive    Ocular History:  Hyperopia, both eyes.   Allergic conjunctivitis, both eyes.     Eye Medications:  None.     Assessment and Plan:    #   Presbyopia, both eyes.   Hold off on glasses given fluctuation in vision secondary to dry eyes.   Repeat refraction with Dr. Souza next visit.     #   Dry Eye Syndrome, both eyes.  Symptoms of dry eyes include blurry vision, eye pain, grittiness, burning, redness, eye irritation, and tearing. The goal is not to eliminate, but to improve symptoms.   Preservative free artificial tears 4 times daily both eyes. Refresh or Systane.      #   Headaches  Optic nerve OCT 09/29/2023: Right eye: borderline thickening inferior nasal; Left eye: borderline thickening inferior nasal.   Borderline optic nerve OCT findings are likely secondary to normal variant. There is no disc edema seen on both eyes which is re-assuring.  As a precaution follow up in the neuro-ophthalmology service with Dr. Souza.   Also follow up with neurology for headaches management.     #   AIDS/HIV - no HIV retinopathy, both eyes.   #   CMV - no cmv retinitis, both eyes.   Last CD4 count was 1,034 on 04/29/2021.     #   Cataract, both eyes.   Not visually significant.   Recommend UV protection.   Recommend annual dilated eye exam.       Patient consented to a dilated eye exam:      Yes. Side effects discussed.    Medical History:  No past medical history on file.    Medications:  Current Outpatient Medications   Medication Sig Dispense Refill    acetaminophen (TYLENOL) 325 MG tablet Take 650 mg by mouth      cetirizine (ZYRTEC) 10 MG tablet Take 1 tablet (10 mg) by mouth every evening 14 tablet 0    emtricitabine-tenofovir (TRUVADA) " 200-300 MG per tablet       EVOTAZ 300-150 MG table       Prenatal Vit-Fe Fumarate-FA (PRENATAL MULTIVITAMIN W/IRON) 27-0.8 MG tablet       TIVICAY 50 MG tablet       triamcinolone (KENALOG) 0.1 % ointment Apply sparingly to affected area three times daily for 14 days. 30 g 0    VITAMIN D3 25 MCG (1000 UT) tablet      Complete documentation of historical and exam elements from today's encounter can be found in the full encounter summary report (not reduplicated in this progress note). I personally obtained the chief complaint(s) and history of present illness.  I confirmed and edited as necessary the review of systems, past medical/surgical history, family history, social history, and examination findings as documented by others; and I examined the patient myself. I personally reviewed the relevant tests, images, and reports as documented above. I formulated and edited as necessary the assessment and plan and discussed the findings and management plan with the patient and family. - Caio Rodriguez OD

## 2023-09-29 NOTE — NURSING NOTE
Chief Complaints and History of Present Illnesses   Patient presents with    Annual Eye Exam     Chief Complaint(s) and History of Present Illness(es)       Annual Eye Exam              Associated symptoms: headache.  Negative for eye pain    Treatments tried: no treatments    Pain scale: 5/10              Comments    Beginning 3 weeks ago, headaches all the time, only relieved by glasses.  She states wearing her reading glasses lessens her headaches but then she cannot see distance.   Her glasses are 3 years old from her latest eye exam.  Online classes means 8+ hrs screen time daily.     Victoriano Nixon 8:48 AM September 29, 2023